# Patient Record
(demographics unavailable — no encounter records)

---

## 2025-03-07 NOTE — CONSULT LETTER
[Courtesy Letter:] : I had the pleasure of seeing your patient, [unfilled], in my office today. [Sincerely,] : Sincerely, [FreeTextEntry2] : Dr. Mark Haynes [address] [facility] [address] [id number] [FreeTextEntry3] : Raffaele Heaton MD Chief, Pediatric Otolaryngology [facility] and Dawna Jones [facility] Professor of Otolaryngology [employer] at [facility]

## 2025-03-07 NOTE — PHYSICAL EXAM
[Placement/Patency] : tympanostomy tube in place and patent [Clear/Ventilated] : middle ear clear and well ventilated [Exposed Vessel] : right anterior vessel not exposed [2+] : 2+ [Increased Work of Breathing] : no increased work of breathing with use of accessory muscles and retractions [Normal Gait and Station] : normal gait and station [Normal muscle strength, symmetry and tone of facial, head and neck musculature] : normal muscle strength, symmetry and tone of facial, head and neck musculature [Normal] : no cervical lymphadenopathy [de-identified] : s/p cleft and lip s/p repair

## 2025-03-07 NOTE — HISTORY OF PRESENT ILLNESS
[No change in the review of systems as noted in prior visit date ___] : No change in the review of systems as noted in prior visit date of [unfilled] [de-identified] : History of cleft lip and cleft palate s/p ear tubes 2/2025 No ear infections No otorrhea No throat infections

## 2025-05-12 NOTE — CONSULT LETTER
[Dear  ___] : Dear  [unfilled], [Consult Letter:] : I had the pleasure of evaluating your patient, [unfilled]. [Please see my note below.] : Please see my note below. [Consult Closing:] : Thank you very much for allowing me to participate in the care of this patient.  If you have any questions, please do not hesitate to contact me. [Sincerely,] : Sincerely, [FreeTextEntry3] : ALEJANDRA Anderson Nurse Practitioner Division of Pediatric Pulmonary Medicine & Cystic Fibrosis Center  [facility]

## 2025-05-12 NOTE — CONSULT LETTER
[Dear  ___] : Dear  [unfilled], [Consult Letter:] : I had the pleasure of evaluating your patient, [unfilled]. [Please see my note below.] : Please see my note below. [Consult Closing:] : Thank you very much for allowing me to participate in the care of this patient.  If you have any questions, please do not hesitate to contact me. [Sincerely,] : Sincerely, [FreeTextEntry3] : ALEJANDRA Anderson Nurse Practitioner Division of Pediatric Pulmonary Medicine & Cystic Fibrosis Center  [facility]

## 2025-05-12 NOTE — HISTORY OF PRESENT ILLNESS
[FreeTextEntry1] : FT. No complications. No NICU stay.  Asthma, Eczema, Cleft Lip, cleft Palate, ETD, CHL.   Possible seasonal/environmental allergies: +itchy eyes, sneezing. No prior allergy eval.  Followed by ENT: Dr. Heaton: Last visit March 2025: S/p ear tubes Feb 2025. 2+tonsils H/o cleft lip repair and Palatoplasty.    Moved to [place] 3 years ago from [place].  H/o two hospital admissions for asthma exacerbations. Recently seen in [facility] ER: 4/28/25: Sent from  as O2 sats were in 80's and he was using accessory muscles. Received Decadron.  CXR: April 2025: "No focal consolidation".  In the past Budesonide used only as intermittent therapy, not used in a "long time".       NEW PATIENT : May 09, 2025 Age of Onset of symptoms: 3-4yrs of age.  PMH: Asthma, Eczema, Cleft Lip, cleft Palate, ETD, CHL.   PSH: palatoplasty and s/p BMT.  Meds: albuterol PRN.  Allergies: NKDA Birth Hx: FT PCP/Specialists: Dr. Gorge Hager   Family hx of asthma: YES, older brother as needed.  Family hx of cystic fibrosis, autoimmune disease, recurrent respiratory infections: denies  Feeding issues, MATTHEW: denies  LETTY sx: very mild snoring. no pauses.  Hx of Eczema: YES Hx of rhinitis, postnasal drip: YES Hx of recurrent infections (ie: pneumonia, AOM, sinusitis): possibly once.  Seen by pulmonologist before: denies    Cough Hx Triggers: URIs Hx of wheezing: YES Use of oral steroids: YES, at least 4 times. (most recent 3 weeks ago with ER visit).  ED/Hospitalizations: YES Daytime cough:   infrequent  Nighttime cough:  infrequent  Respiratory symptoms with exercise: denies  Chest x-ray:  YES, 4/28/25.  Vaccines UTD:  YES  Influenza vaccine:   YES  COVID 19 vaccine: YES H/o COVID19/RSV infection: never tested +     Modified Asthma Predictive Index (mAPI): 4 wheezing illnesses AND 1 major criteria Parental asthma: NO, but older brother with asthma atopic dermatitis: YES Aeroallergen sensitization suspected: YES   OR   2 minor criteria Food sensitization: NO Peripheral blood eosinophilia =4%: Wheezing apart from colds:  NO

## 2025-05-12 NOTE — REVIEW OF SYSTEMS
[Fever] : no fever [Frequent URIs] : no frequent upper respiratory infections [Snoring] : no snoring [Apnea] : no apnea [Nasal Congestion] : no nasal congestion [Heart Disease] : no heart disease [Wheezing] : wheezing [Cough] : cough [Problems Swallowing] : no problems swallowing [Seizure] : no seizures [Eczema] : eczema [Failure To Thrive] : no failure to thrive [FreeTextEntry4] : h/o cleft lip and palate.  [Immunizations are up to date] : Immunizations are up to date

## 2025-05-12 NOTE — REASON FOR VISIT
[Initial Evaluation] : an initial evaluation of [Asthma/RAD] : asthma/RAD [Father] : father [Medical Records] : medical records

## 2025-05-12 NOTE — HISTORY OF PRESENT ILLNESS
[FreeTextEntry1] : FT. No complications. No NICU stay.  Asthma, Eczema, Cleft Lip, cleft Palate, ETD, CHL.   Possible seasonal/environmental allergies: +itchy eyes, sneezing. No prior allergy eval.  Followed by ENT: Dr. Heaton: Last visit March 2025: S/p ear tubes Feb 2025. 2+tonsils H/o cleft lip repair and Palatoplasty.    Moved to [place] 3 years ago from [place].  H/o two hospital admissions for asthma exacerbations. Recently seen in [facility]: 4/28/25: Sent from  as O2 sats were in 80's and he was using accessory muscles. Received Decadron.  CXR: April 2025: "No focal consolidation".  In the past Budesonide used only as intermittent therapy, not used in a "long time".       NEW PATIENT : May 09, 2025 Age of Onset of symptoms: 3-4yrs of age.  PMH: Asthma, Eczema, Cleft Lip, cleft Palate, ETD, CHL.   PSH: palatoplasty and s/p BMT.  Meds: albuterol PRN.  Allergies: NKDA Birth Hx: FT PCP/Specialists: Dr. Gorge Hager   Family hx of asthma: YES, older brother as needed.  Family hx of cystic fibrosis, autoimmune disease, recurrent respiratory infections: denies  Feeding issues, MATTHEW: denies  LETTY sx: very mild snoring. no pauses.  Hx of Eczema: YES Hx of rhinitis, postnasal drip: YES Hx of recurrent infections (ie: pneumonia, AOM, sinusitis): possibly once.  Seen by pulmonologist before: denies    Cough Hx Triggers: URIs Hx of wheezing: YES Use of oral steroids: YES, at least 4 times. (most recent 3 weeks ago with ER visit).  ED/Hospitalizations: YES Daytime cough:   infrequent  Nighttime cough:  infrequent  Respiratory symptoms with exercise: denies  Chest x-ray:  YES, 4/28/25.  Vaccines UTD:  YES  Influenza vaccine:   YES  COVID 19 vaccine: YES H/o COVID19/RSV infection: never tested +     Modified Asthma Predictive Index (mAPI): 4 wheezing illnesses AND 1 major criteria Parental asthma: NO, but older brother with asthma atopic dermatitis: YES Aeroallergen sensitization suspected: YES   OR   2 minor criteria Food sensitization: NO Peripheral blood eosinophilia =4%: Wheezing apart from colds:  NO

## 2025-05-12 NOTE — PHYSICAL EXAM
[Well Nourished] : well nourished [Well Developed] : well developed [Well Groomed] : well groomed [Alert] : ~L alert [Active] : active [Normal Breathing Pattern] : normal breathing pattern [No Respiratory Distress] : no respiratory distress [No Nasal Drainage] : no nasal drainage [No Oral Cyanosis] : no oral cyanosis [Tonsil Size ___] : tonsil size [unfilled] [No Stridor] : no stridor [Absence Of Retractions] : absence of retractions [Symmetric] : symmetric [Good Expansion] : good expansion [No Acc Muscle Use] : no accessory muscle use [Good aeration to bases] : good aeration to bases [Equal Breath Sounds] : equal breath sounds bilaterally [Normal Sinus Rhythm] : normal sinus rhythm [Soft, Non-Tender] : soft, non-tender [Full ROM] : full range of motion [No Clubbing] : no clubbing [Capillary Refill < 2 secs] : capillary refill less than two seconds [No Cyanosis] : no cyanosis [Abnormal Walk] : normal gait [Alert and  Oriented] : alert and oriented [FreeTextEntry1] : healed cleft lip [FreeTextEntry2] : +allergic shiners [FreeTextEntry3] : +b/l ear tubes [FreeTextEntry7] : +mild b/l expiratory wheeze [de-identified] : +eczema

## 2025-05-12 NOTE — SOCIAL HISTORY
[Mother] : mother [Father] : father [Grandparent(s)] : grandparent(s) [___ Brothers] : [unfilled] brothers [___ Sisters] : [unfilled] sisters [Grade:  _____] : Grade: [unfilled] [Dog] : dog [Bedroom] : not in the bedroom [Living Area] : not in the living area [Smokers in Household] : there are no smokers in the home [de-identified] : shereen

## 2025-05-12 NOTE — PHYSICAL EXAM
[Well Nourished] : well nourished [Well Developed] : well developed [Well Groomed] : well groomed [Alert] : ~L alert [Active] : active [Normal Breathing Pattern] : normal breathing pattern [No Respiratory Distress] : no respiratory distress [No Nasal Drainage] : no nasal drainage [No Oral Cyanosis] : no oral cyanosis [Tonsil Size ___] : tonsil size [unfilled] [No Stridor] : no stridor [Absence Of Retractions] : absence of retractions [Symmetric] : symmetric [Good Expansion] : good expansion [No Acc Muscle Use] : no accessory muscle use [Good aeration to bases] : good aeration to bases [Equal Breath Sounds] : equal breath sounds bilaterally [Normal Sinus Rhythm] : normal sinus rhythm [Soft, Non-Tender] : soft, non-tender [Full ROM] : full range of motion [No Clubbing] : no clubbing [Capillary Refill < 2 secs] : capillary refill less than two seconds [No Cyanosis] : no cyanosis [Abnormal Walk] : normal gait [Alert and  Oriented] : alert and oriented [FreeTextEntry1] : healed cleft lip [FreeTextEntry2] : +allergic shiners [FreeTextEntry3] : +b/l ear tubes [FreeTextEntry7] : +mild b/l expiratory wheeze [de-identified] : +eczema

## 2025-05-12 NOTE — SOCIAL HISTORY
[Mother] : mother [Father] : father [Grandparent(s)] : grandparent(s) [___ Brothers] : [unfilled] brothers [___ Sisters] : [unfilled] sisters [Grade:  _____] : Grade: [unfilled] [Dog] : dog [Bedroom] : not in the bedroom [Living Area] : not in the living area [Smokers in Household] : there are no smokers in the home [de-identified] : shereen

## 2025-06-10 NOTE — HISTORY OF PRESENT ILLNESS
[de-identified] : Escobar is an 8 year old boy with a history of cleft lip and palate s/p repair who presents for initial allergy evaluation.  Hx of asthma - has been admitted in the past. Asthma significantly flared when he moved from [place] to [place] at 5 years of age. [place] [facility] Goes to urgent care about 2 times per year. Had no ICS use prior to recent starting of ICS. Has always had a bit of a cough and post-nasal drip (but complicated by fistula with cleft lip). Previously had nocturnal cough and had lingering cough.  Last month he had a bad asthma episode. Went to the ED and was given BTB nebs, steroids and O2 sat was low. Seen by pulm - he was started on Asmanex 100, 2 puffs BID. Since starting ICS his cough seems to have resolved. Using a mask with his spacer. Previously used albuterol with sickness and then PRN. No albuterol use since starting Asmanex.  Had been using Flonase quite a bit as per ENT. Has had a few rounds of ear tubes. Follows with Dr. Guy.  Allergic rhinitis: Puffy eyes, watery eyes, nasal congestion, sneezing. Takes zyrtec PRN.  Food allergy: No suspicion for food allergy.  Tolerates milk, eggs, wheat, soy, peanut, tree nut, fish and shellfish, sesame. Eats curries withno problem (?tolerant to cashew, almond)

## 2025-06-10 NOTE — SOCIAL HISTORY
[House] : [unfilled] lives in a house  [Humidifier] : uses a humidifier [Dog] : dog [Smokers in Household] : there are no smokers in the home [de-identified] : air purifiers [de-identified] : wood

## 2025-06-10 NOTE — CONSULT LETTER
[Dear  ___] : Dear  [unfilled], [Consult Letter:] : I had the pleasure of evaluating your patient, [unfilled]. [Consult Closing:] : Thank you very much for allowing me to participate in the care of this patient.  If you have any questions, please do not hesitate to contact me. [Please see my note below.] : Please see my note below. [Sincerely,] : Sincerely, [FreeTextEntry2] : Gorge Hager MD [FreeTextEntry3] : Kiesha Wilson MD Attending Physician  Division of Allergy/Immunology  [facility]    of Medicine and Pediatrics [facility] [place] [address] Tel: [phone] Fax: [phone] Email: [email or web address]

## 2025-07-18 NOTE — CONSULT LETTER
[Dear  ___] : Dear  [unfilled], [Courtesy Letter:] : I had the pleasure of seeing your patient, [unfilled], in my office today. [Sincerely,] : Sincerely, [DrFarrah  ___] : Dr. ABDI [FreeTextEntry3] : Marcia Guy MD  Pediatric Otolaryngology/ Head & [facility] at [facility]   [address] [place] [address] Tel [phone] Fax [phone]

## 2025-07-18 NOTE — SURGICAL HISTORY
[TextEntry] : Cleft lip and palate repair (2019) in [place]. Bilateral myringotomy tubes x4? alveolar graft

## 2025-07-18 NOTE — BIRTH HISTORY
[TextEntry] : Birth History:  Born full term via  delivery. No delivery complications. No maternal complications

## 2025-07-18 NOTE — SOCIAL HISTORY
[TextEntry] :  at home with parents and siblings. No pets in the home. No secondhand smoke exposure.

## 2025-07-18 NOTE — HISTORY OF PRESENT ILLNESS
[de-identified] : 8 year old male presents for follow up evaluation for hearing loss  s/p ear tubes 2/2025  History of cleft lip and palate-repaired in (2019) History of cleft fistula repaired.Multiple tubes with concern for mrsa otorrhea and desire not to repeat tubes-[place] Followed by Dr. Cailin Walter, Currently in speech therapy at school once weekly for summer -- twice weekly during school year  No recent ear infections since last visit States hearing is stable.  Not currently using flonase at this time  Hx cough and PND No nasal congestion No longer using palate expander -- has braces.  Snoring is improved No daytime fatigue or concentration issues.   Nasal regurgitation when eating or drinking has improved since closed fistula

## 2025-07-18 NOTE — ASSESSMENT
[FreeTextEntry1] : The patient is doing well postoperatively after ear tubes. Return to Clinic in 3-6 months for an ear tube check or sooner if symptoms return.   In the future should there be malodorous ear drainage, pain, fevers, the patient may get floxin otic (5 drops bid for 7-10 days as needed for otorrhea/infection) or ciprodex or sulfa/prednisolone otic for infections with significant ear canal swelling. Patients with ear tubes SHOULD NOT routinely GET corticosporin/neomycin/tobradex/tobramycin drops. Oral antibiotics are typically reserved for Acute Otitis Media with intact eardrums (should the ear tube extrude sooner than expected) or for patients with draining ear tubes as well as erythema/cellulitis of the tragus/skin or worsening symptoms.  phoneme specific nasal air emission with s's-will fu SLP will fu plastics and dental omfs  PSG if snoring.

## 2025-07-18 NOTE — PHYSICAL EXAM
[TextEntry] : PHYSICAL EXAM:  CONSTITUTIONAL: well nourished, well developed, NON-DYSMORPHIC, and in no acute distress.    EYES: pupils equal and round and no abnormalities of the conjunctivae and lids.   RESPIRATORY: respirations unlabored, no increased work of breathing with use of accessory muscles and retractions. NO STRIDOR.  CARDIAC: warm extremities, no cyanosis. ABDOMEN: nondistended. THORAX: no gross deformities, no pectus defects.  NEUROLOGIC: cranial nerves II - VII and IX - XII with no gross deficits.  MUSCULOSKELETAL: normal muscle STRENGTH, symmetry and tone of facial, head and neck musculature, no clubbing.  INTEGUMENTARY: no obvious skin rash, no skin lesions. LYMPHATIC: no cervical lymphadenopathy.  PSYCHIATRIC: age APPROPRIATE behavior.  HEAD: normocephalic, atraumatic.  RIGHT EAR: The right pinna was normal. The right external auditory canal was normal and the right TYMPANIC MEMBRANE was intact and with a retraction no erythema tube in place LEFT EAR: The left pinna was normal. The left external auditory canal was normal and the left TYMPANIC MEMBRANE was intact and with a retraction no erythema tube in place NOSE: External Nose: widened   Anterior Nasal Cavity: the right nasal cavity was normal and the left nasal cavity was normal.  ORAL CAVITY/OROPHARYNX: normal mucosa, healed palate, bifid uvular, widened nose BL cleft lip scars healed, phoneme specific nasal air emission with s's improving  Right TONSIL Size: 3+ Left TONSIL Size: 3+ can see inferior adenoids with palate elevation   NECK: normal with no obvious cervical lesions